# Patient Record
Sex: MALE | Race: WHITE
[De-identification: names, ages, dates, MRNs, and addresses within clinical notes are randomized per-mention and may not be internally consistent; named-entity substitution may affect disease eponyms.]

---

## 2018-01-23 ENCOUNTER — HOSPITAL ENCOUNTER (EMERGENCY)
Dept: HOSPITAL 46 - ED | Age: 78
Discharge: HOME | End: 2018-01-23
Payer: MEDICARE

## 2018-01-23 VITALS — WEIGHT: 180.01 LBS | HEIGHT: 71 IN | BODY MASS INDEX: 25.2 KG/M2

## 2018-01-23 DIAGNOSIS — I25.2: ICD-10-CM

## 2018-01-23 DIAGNOSIS — Z79.82: ICD-10-CM

## 2018-01-23 DIAGNOSIS — Z79.899: ICD-10-CM

## 2018-01-23 DIAGNOSIS — R07.9: Primary | ICD-10-CM

## 2018-01-24 NOTE — EKG
[facility]
                                    [address] [facility] [address]
                                  [place]  [address]
_________________________________________________________________________________________
                                                                 Signed   
 
 
Sinus bradycardia with 1st degree AV block with premature atrial complexes
Otherwise normal ECG
No previous ECGs available
Confirmed by TI SHAVER MD ([id number]) on 1/24/2018 3:28:36 PM
 
 
 
 
 
 
 
 
 
 
 
 
 
 
 
 
 
 
 
 
 
 
 
 
 
 
 
 
 
 
 
 
 
 
 
 
 
 
 
 
 
    Electronically Signed By: TI SHAVER MD  01/24/18 1528
_________________________________________________________________________________________
PATIENT NAME:     DOMENICA NICHOLS                  
MEDICAL RECORD #: [id number]                     Electrocardiogram             
          ACCT #: [id number]  
DATE OF BIRTH:   [birth year]                                       
PHYSICIAN:   TI SHAVER MD           REPORT #: [id number]
REPORT IS CONFIDENTIAL AND NOT TO BE RELEASED WITHOUT AUTHORIZATION

## 2021-07-29 NOTE — EKG
[facility]
                                    [address] [facility]
                                  [place]  [address]
_________________________________________________________________________________________
                                                                 Signed   
 
 
Sinus bradycardia with 1st degree AV block
Otherwise normal ECG
When compared with ECG of 23-JAN-2018 15:05,
premature atrial complexes are no longer present
Confirmed by HONEY EUGENE DO ([id number]) on 7/29/2021 8:59:00 PM
 
 
 
 
 
 
 
 
 
 
 
 
 
 
 
 
 
 
 
 
 
 
 
 
 
 
 
 
 
 
 
 
 
 
 
 
 
 
 
 
    Electronically Signed By: HONEY EUGENE DO  07/29/21 2059
_________________________________________________________________________________________
PATIENT NAME:     DOMENICA NICHOLS                  
MEDICAL RECORD #: [id number]                     Electrocardiogram             
          ACCT #: [id number]  
DATE OF BIRTH:   [birth year]                                       
PHYSICIAN:   HONEY EUGENE DO                     REPORT #: [id number]
REPORT IS CONFIDENTIAL AND NOT TO BE RELEASED WITHOUT AUTHORIZATION